# Patient Record
Sex: FEMALE | Race: WHITE | NOT HISPANIC OR LATINO | Employment: UNEMPLOYED | URBAN - METROPOLITAN AREA
[De-identification: names, ages, dates, MRNs, and addresses within clinical notes are randomized per-mention and may not be internally consistent; named-entity substitution may affect disease eponyms.]

---

## 2024-02-21 ENCOUNTER — TRANSCRIBE ORDERS (OUTPATIENT)
Dept: ADMINISTRATIVE | Facility: HOSPITAL | Age: 69
End: 2024-02-21
Payer: MEDICARE

## 2024-02-21 DIAGNOSIS — Z12.31 SCREENING MAMMOGRAM FOR BREAST CANCER: Primary | ICD-10-CM

## 2024-02-28 ENCOUNTER — HOSPITAL ENCOUNTER (OUTPATIENT)
Dept: MAMMOGRAPHY | Facility: HOSPITAL | Age: 69
Discharge: HOME | End: 2024-02-28
Admitting: INTERNAL MEDICINE
Payer: MEDICARE

## 2024-02-28 DIAGNOSIS — Z12.31 SCREENING MAMMOGRAM FOR BREAST CANCER: ICD-10-CM

## 2024-02-28 PROCEDURE — 77063 BREAST TOMOSYNTHESIS BI: CPT

## 2024-02-28 PROCEDURE — 77067 SCR MAMMO BI INCL CAD: CPT

## 2024-08-22 ENCOUNTER — TRANSCRIBE ORDERS (OUTPATIENT)
Dept: ADMINISTRATIVE | Facility: HOSPITAL | Age: 69
End: 2024-08-22
Payer: MEDICARE

## 2024-08-22 DIAGNOSIS — K22.4 ESOPHAGOSPASM: Primary | ICD-10-CM

## 2024-09-30 ENCOUNTER — HOSPITAL ENCOUNTER (OUTPATIENT)
Dept: GENERAL RADIOLOGY | Facility: HOSPITAL | Age: 69
Discharge: HOME | End: 2024-09-30
Admitting: INTERNAL MEDICINE
Payer: MEDICARE

## 2024-09-30 DIAGNOSIS — K22.4 ESOPHAGOSPASM: ICD-10-CM

## 2024-09-30 PROCEDURE — 63710000001 BARIUM SULFATE 40 % SUSPENSION: Performed by: INTERNAL MEDICINE

## 2024-09-30 PROCEDURE — 63710000001 BARIUM SULFATE 700 MG TABLET: Performed by: INTERNAL MEDICINE

## 2024-09-30 PROCEDURE — A9270 NON-COVERED ITEM OR SERVICE: HCPCS | Performed by: INTERNAL MEDICINE

## 2024-09-30 PROCEDURE — 63710000001 BARIUM SULFATE 98 % RECONSTITUTED SUSPENSION: Performed by: INTERNAL MEDICINE

## 2024-09-30 PROCEDURE — 63710000001 BARIUM SULFATE 40 % PASTE: Performed by: INTERNAL MEDICINE

## 2024-09-30 PROCEDURE — 63710000001 SOD BICARB-CITRIC ACID-SIMETHICONE 2.21-1.53-0.04 G PACK: Performed by: INTERNAL MEDICINE

## 2024-09-30 PROCEDURE — 92611 MOTION FLUOROSCOPY/SWALLOW: CPT

## 2024-09-30 PROCEDURE — 74230 X-RAY XM SWLNG FUNCJ C+: CPT

## 2024-09-30 PROCEDURE — 63710000001 BARIUM SULFATE 60 % SUSPENSION: Performed by: INTERNAL MEDICINE

## 2024-09-30 PROCEDURE — 63710000001 BARIUM SULFATE 40 % RECONSTITUTED SUSPENSION: Performed by: INTERNAL MEDICINE

## 2024-09-30 RX ADMIN — BARIUM SULFATE 50 ML: 400 SUSPENSION ORAL at 10:50

## 2024-09-30 RX ADMIN — BARIUM SULFATE 355 ML: 0.6 SUSPENSION ORAL at 10:50

## 2024-09-30 RX ADMIN — BARIUM SULFATE 55 ML: 0.81 POWDER, FOR SUSPENSION ORAL at 10:50

## 2024-09-30 RX ADMIN — BARIUM SULFATE 135 ML: 980 POWDER, FOR SUSPENSION ORAL at 10:50

## 2024-09-30 RX ADMIN — BARIUM SULFATE 15 ML: 400 PASTE ORAL at 10:50

## 2024-09-30 RX ADMIN — ANTACID/ANTIFLATULENT 1 PACKET: 380; 550; 10; 10 GRANULE, EFFERVESCENT ORAL at 10:35

## 2024-09-30 RX ADMIN — BARIUM SULFATE 700 MG: 700 TABLET ORAL at 10:50

## 2024-09-30 NOTE — MBS/VFSS/FEES
outpatient  - Speech Language Pathology   Swallow  modified barium swallow study   [facility]     Patient Name: Aury Russell  : 1955  MRN: [id number]  Today's Date: 2024               Admit Date: 2024    Visit Dx:     ICD-10-CM ICD-9-CM   1. Esophagospasm  K22.4 530.5     There is no problem list on file for this patient.    No past medical history on file.  No past surgical history on file.    SLP Recommendation and Plan         MODIFIED BARIUM SWALLOW STUDY: SPEECH PATHOLOGY REPORT        DATE OF SERVICE:  24    PERTINENT INFORMATION:  Ms Russell is a 69 year old female with complaint of dysphagia.    She was referred for an MBSS by Dr. Talley to rule out aspiration as well as to determine appropriate treatment plan for this patient.      PROCEDURE:    Ms Russell was alert and cooperative.  The patient was viewed in lateral plane.  The following Ba consistencies were administered:  thin barium, nectar thick barium, pudding thick barium, barium mixed with applesauce, barium mixed with cracker.  The following compensatory swallowing strategies were performed: bolus modification, cyclic ingestion.       RESULTS:    1.  Oral phase within normal limits. Adequate bolus manipulation and transit. Pharyngeal phase with normal epiglottic deflection and pharyngeal stripping. No laryngeal penetration or tracheal aspiration observed.       IMPRESSIONS:    Ms Russell demonstrated oral pharyngeal swallow appearing within normal limits. No laryngeal penetration or tracheal aspiration observed during this study.      FUNCTIONAL DEFICIT: Patient scored level 7 of 7 on Functional Communication Measures for swallowing indicating a 0% limitation in function for current status, goal status, and discharge status.      RECOMMENDATIONS:   1.  Diet: regular solids and thin liquids  2.  Positioning: fully upright for all po.        Yes, Patient/responsible party agrees with the plan of care and has been informed of all  alternatives, risks and benefits.    Thank you for this referral.                                                                                 EDUCATION  The patient has been educated in the following areas:   Dysphagia (Swallowing Impairment).                Time Calculation:       Therapy Charges for Today       Code Description Service Date Service Provider Modifiers Qty    [id number] HC ST MOTION FLUORO EVAL SWALLOW 6 9/30/2024 Yelena Hines, MS-CCC/SLP, CNT GN 1                 DORA BradshawCCC/SLP, ANUP  9/30/2024

## 2025-03-03 ENCOUNTER — TRANSCRIBE ORDERS (OUTPATIENT)
Dept: ADMINISTRATIVE | Facility: HOSPITAL | Age: 70
End: 2025-03-03
Payer: MEDICARE

## 2025-03-03 DIAGNOSIS — Z12.31 SCREENING MAMMOGRAM FOR BREAST CANCER: Primary | ICD-10-CM

## 2025-03-11 ENCOUNTER — HOSPITAL ENCOUNTER (OUTPATIENT)
Dept: MAMMOGRAPHY | Facility: HOSPITAL | Age: 70
Discharge: HOME | End: 2025-03-11
Admitting: INTERNAL MEDICINE
Payer: MEDICARE

## 2025-03-11 DIAGNOSIS — Z12.31 SCREENING MAMMOGRAM FOR BREAST CANCER: ICD-10-CM

## 2025-03-11 PROCEDURE — 77067 SCR MAMMO BI INCL CAD: CPT

## 2025-03-11 PROCEDURE — 77063 BREAST TOMOSYNTHESIS BI: CPT
